# Patient Record
Sex: FEMALE | Race: BLACK OR AFRICAN AMERICAN
[De-identification: names, ages, dates, MRNs, and addresses within clinical notes are randomized per-mention and may not be internally consistent; named-entity substitution may affect disease eponyms.]

---

## 2017-02-22 ENCOUNTER — HOSPITAL ENCOUNTER (OUTPATIENT)
Dept: HOSPITAL 47 - RADMRIMAIN | Age: 47
Discharge: HOME | End: 2017-02-22
Payer: COMMERCIAL

## 2017-02-22 DIAGNOSIS — M25.552: Primary | ICD-10-CM

## 2017-02-22 NOTE — MR
EXAMINATION TYPE: MR hip LT wo con

 

DATE OF EXAM: 2/22/2017 5:36 PM

 

COMPARISON: Outside two-view left hip x-ray February 8, 2017. CT abdomen and pelvis April 18, 2016. 

 

HISTORY: Lt Hip Pain

 

Standard multiplanar, multisequence MRI departmental protocol

 

Multiplanar, multisequence images of the pelvis focus in the left hip were acquired. 

 

FINDINGS: There is metallic artifact degradation at level of right hip joint identified related to fe
moral fixating mee. Hip joints are symmetric with mild axial joint space loss seen bilaterally. There
 are small symmetric physiologic hip joint effusions noted bilaterally. Bone marrow signal intensity 
in the pelvis and visualized left hip show some heterogeneity without suspicious edema. No significan
t fluid signal seen at level of greater or lesser trochanters in the left hip. Muscle bulk in the lef
t thigh is maintained. Labrum appears intact given limitation of nonarthrogram study. No suspicious b
owel or groin containing inguinal hernia is seen. No suspicious groin adenopathy is noted.

 

Urinary bladder is felt within normal limits. Uterus is anteverted in shape and within normal limits 
in size. There is round 2 cm lesion in right aspect of uterine myometrium felt to reflect small fibro
id seen best on axial image 20 incidentally noted. Small moderate free fluid is seen in the pelvis. V
isualized portion of both ovaries show scattered follicles.

 

IMPRESSION: 

No significant finding is seen to account for patient's symptoms.

## 2017-05-02 ENCOUNTER — HOSPITAL ENCOUNTER (OUTPATIENT)
Dept: HOSPITAL 47 - LABWHC1 | Age: 47
Discharge: HOME | End: 2017-05-02
Attending: NURSE PRACTITIONER
Payer: MEDICAID

## 2017-05-02 DIAGNOSIS — E83.39: ICD-10-CM

## 2017-05-02 DIAGNOSIS — N18.3: ICD-10-CM

## 2017-05-02 DIAGNOSIS — D64.9: Primary | ICD-10-CM

## 2017-05-02 DIAGNOSIS — M10.9: ICD-10-CM

## 2017-05-02 DIAGNOSIS — N39.0: ICD-10-CM

## 2017-05-02 DIAGNOSIS — E55.9: ICD-10-CM

## 2017-05-02 LAB
ANION GAP SERPL CALC-SCNC: 9 MMOL/L
BASOPHILS # BLD AUTO: 0 K/UL (ref 0–0.2)
BASOPHILS NFR BLD AUTO: 1 %
BUN SERPL-SCNC: 19 MG/DL (ref 7–17)
CALCIUM SPEC-MCNC: 9.4 MG/DL (ref 8.4–10.2)
CH: 33.3
CHCM: 34.3
CHLORIDE SERPL-SCNC: 107 MMOL/L (ref 98–107)
CO2 SERPL-SCNC: 28 MMOL/L (ref 22–30)
EOSINOPHIL # BLD AUTO: 0.3 K/UL (ref 0–0.7)
EOSINOPHIL NFR BLD AUTO: 7 %
ERYTHROCYTE [DISTWIDTH] IN BLOOD BY AUTOMATED COUNT: 3.48 M/UL (ref 3.8–5.4)
ERYTHROCYTE [DISTWIDTH] IN BLOOD: 13.9 % (ref 11.5–15.5)
GLUCOSE SERPL-MCNC: 82 MG/DL (ref 74–99)
HCT VFR BLD AUTO: 34 % (ref 34–46)
HDW: 2.65
HGB BLD-MCNC: 11.6 GM/DL (ref 11.4–16)
IRON SERPL-MCNC: 75 UG/DL (ref 37–170)
LUC NFR BLD AUTO: 2 %
LYMPHOCYTES # SPEC AUTO: 1.3 K/UL (ref 1–4.8)
LYMPHOCYTES NFR SPEC AUTO: 31 %
MAGNESIUM SPEC-SCNC: 2 MG/DL (ref 1.6–2.3)
MCH RBC QN AUTO: 33.2 PG (ref 25–35)
MCHC RBC AUTO-ENTMCNC: 34.1 G/DL (ref 31–37)
MCV RBC AUTO: 97.6 FL (ref 80–100)
MONOCYTES # BLD AUTO: 0.2 K/UL (ref 0–1)
MONOCYTES NFR BLD AUTO: 4 %
NEUTROPHILS # BLD AUTO: 2.3 K/UL (ref 1.3–7.7)
NEUTROPHILS NFR BLD AUTO: 55 %
NON-AFRICAN AMERICAN GFR(MDRD): 44
PH UR: 7 [PH] (ref 5–8)
PHOSPHATE SERPL-MCNC: 3.8 MG/DL (ref 2.5–4.5)
POTASSIUM SERPL-SCNC: 4.1 MMOL/L (ref 3.5–5.1)
SODIUM SERPL-SCNC: 144 MMOL/L (ref 137–145)
SP GR UR: 1.01 (ref 1–1.03)
TIBC SERPL-MCNC: 252 UG/DL (ref 265–497)
UA BILLING (MACRO VS. MICRO): (no result)
URATE SERPL-MCNC: 5.1 MG/DL (ref 3.7–7.4)
UROBILINOGEN UR QL STRIP: <2 MG/DL (ref ?–2)
WBC # BLD AUTO: 0.09 10*3/UL
WBC # BLD AUTO: 4.2 K/UL (ref 3.8–10.6)
WBC (PEROX): 3.86

## 2017-05-02 PROCEDURE — 85025 COMPLETE CBC W/AUTO DIFF WBC: CPT

## 2017-05-02 PROCEDURE — 80048 BASIC METABOLIC PNL TOTAL CA: CPT

## 2017-05-02 PROCEDURE — 83550 IRON BINDING TEST: CPT

## 2017-05-02 PROCEDURE — 82306 VITAMIN D 25 HYDROXY: CPT

## 2017-05-02 PROCEDURE — 83540 ASSAY OF IRON: CPT

## 2017-05-02 PROCEDURE — 84100 ASSAY OF PHOSPHORUS: CPT

## 2017-05-02 PROCEDURE — 81003 URINALYSIS AUTO W/O SCOPE: CPT

## 2017-05-02 PROCEDURE — 87086 URINE CULTURE/COLONY COUNT: CPT

## 2017-05-02 PROCEDURE — 83970 ASSAY OF PARATHORMONE: CPT

## 2017-05-02 PROCEDURE — 36415 COLL VENOUS BLD VENIPUNCTURE: CPT

## 2017-05-02 PROCEDURE — 83735 ASSAY OF MAGNESIUM: CPT

## 2017-05-02 PROCEDURE — 84550 ASSAY OF BLOOD/URIC ACID: CPT

## 2017-05-02 PROCEDURE — 82728 ASSAY OF FERRITIN: CPT

## 2017-08-30 ENCOUNTER — HOSPITAL ENCOUNTER (OUTPATIENT)
Dept: HOSPITAL 47 - LABWHC1 | Age: 47
Discharge: HOME | End: 2017-08-30
Attending: FAMILY MEDICINE
Payer: MEDICAID

## 2017-08-30 DIAGNOSIS — N18.3: Primary | ICD-10-CM

## 2017-08-30 DIAGNOSIS — R53.83: ICD-10-CM

## 2017-08-30 LAB
ALP SERPL-CCNC: 53 U/L (ref 38–126)
ALT SERPL-CCNC: 26 U/L (ref 9–52)
ANION GAP SERPL CALC-SCNC: 8 MMOL/L
AST SERPL-CCNC: 12 U/L (ref 14–36)
BASOPHILS # BLD AUTO: 0 K/UL (ref 0–0.2)
BASOPHILS NFR BLD AUTO: 1 %
BUN SERPL-SCNC: 20 MG/DL (ref 7–17)
CALCIUM SPEC-MCNC: 9.5 MG/DL (ref 8.4–10.2)
CH: 33.8
CHCM: 34.6
CHLORIDE SERPL-SCNC: 108 MMOL/L (ref 98–107)
CHOLEST SERPL-MCNC: 190 MG/DL (ref ?–200)
CO2 SERPL-SCNC: 27 MMOL/L (ref 22–30)
EOSINOPHIL # BLD AUTO: 0.3 K/UL (ref 0–0.7)
EOSINOPHIL NFR BLD AUTO: 7 %
ERYTHROCYTE [DISTWIDTH] IN BLOOD BY AUTOMATED COUNT: 3.76 M/UL (ref 3.8–5.4)
ERYTHROCYTE [DISTWIDTH] IN BLOOD: 14.3 % (ref 11.5–15.5)
GLUCOSE SERPL-MCNC: 79 MG/DL (ref 74–99)
HCT VFR BLD AUTO: 37 % (ref 34–46)
HDLC SERPL-MCNC: 51 MG/DL (ref 40–60)
HDW: 2.68
HGB BLD-MCNC: 12.4 GM/DL (ref 11.4–16)
LUC NFR BLD AUTO: 2 %
LYMPHOCYTES # SPEC AUTO: 1.7 K/UL (ref 1–4.8)
LYMPHOCYTES NFR SPEC AUTO: 40 %
MCH RBC QN AUTO: 33.1 PG (ref 25–35)
MCHC RBC AUTO-ENTMCNC: 33.6 G/DL (ref 31–37)
MCV RBC AUTO: 98.3 FL (ref 80–100)
MONOCYTES # BLD AUTO: 0.2 K/UL (ref 0–1)
MONOCYTES NFR BLD AUTO: 5 %
NEUTROPHILS # BLD AUTO: 1.9 K/UL (ref 1.3–7.7)
NEUTROPHILS NFR BLD AUTO: 46 %
NON-AFRICAN AMERICAN GFR(MDRD): 41
POTASSIUM SERPL-SCNC: 4.4 MMOL/L (ref 3.5–5.1)
PROT SERPL-MCNC: 7.6 G/DL (ref 6.3–8.2)
SODIUM SERPL-SCNC: 143 MMOL/L (ref 137–145)
WBC # BLD AUTO: 0.08 10*3/UL
WBC # BLD AUTO: 4.2 K/UL (ref 3.8–10.6)
WBC (PEROX): 4.26

## 2017-08-30 PROCEDURE — 85025 COMPLETE CBC W/AUTO DIFF WBC: CPT

## 2017-08-30 PROCEDURE — 80053 COMPREHEN METABOLIC PANEL: CPT

## 2017-08-30 PROCEDURE — 80061 LIPID PANEL: CPT

## 2017-08-30 PROCEDURE — 36415 COLL VENOUS BLD VENIPUNCTURE: CPT

## 2017-09-07 ENCOUNTER — HOSPITAL ENCOUNTER (OUTPATIENT)
Dept: HOSPITAL 47 - RADMAMWWP | Age: 47
Discharge: HOME | End: 2017-09-07
Attending: FAMILY MEDICINE
Payer: MEDICAID

## 2017-09-07 DIAGNOSIS — Z12.31: Primary | ICD-10-CM

## 2017-09-07 PROCEDURE — 77063 BREAST TOMOSYNTHESIS BI: CPT

## 2017-09-13 NOTE — MM
Reason for exam: screening  (asymptomatic).

Last mammogram was performed 2 years and 7 months ago.



Physical Findings:

A clinical breast exam by your physician is recommended on an annual basis and 

results should be correlated with mammographic findings.



MG 3D Screening Mammo W/Cad

Bilateral CC and MLO view(s) were taken.

Prior study comparison: February 18, 2015, mammogram, performed at [facility].

January 22, 2014, mammogram, performed at [facility].

The breast tissue is heterogeneously dense. This may lower the sensitivity of 

mammography.  Focal asymmetry outer right CC view, 4cm from nipple.

This finding is changed when compared with previous exams.





ASSESSMENT: Incomplete: need additional imaging evaluation, BI-RAD 0



RECOMMENDATION:

Special view mammogram of the right breast.



If lesion persists on supplemental views, image directed ultrasound is 

recommended.



[facility] will attempt to contact patient to return for supplemental 

views and ultrasound if indicated.

## 2017-09-14 ENCOUNTER — HOSPITAL ENCOUNTER (OUTPATIENT)
Dept: HOSPITAL 47 - RADMAMWWP | Age: 47
End: 2017-09-14
Attending: FAMILY MEDICINE
Payer: MEDICAID

## 2017-09-14 DIAGNOSIS — R92.8: Primary | ICD-10-CM

## 2017-09-14 NOTE — MM
Reason for exam: additional evaluation requested from abnormal screening.

Last mammogram was performed less than 1 month ago.



Physical Findings:

Nurse did not find any significant physical abnormalities on exam.



MG 3D Work Up W/Cad RT

CC and MLO view(s) were taken of the right breast.

Prior study comparison: September 7, 2017, bilateral MG 3d screening mammo w/cad. 

February 18, 2015, mammogram, performed at [facility].

Asymmetric breast tissue right outer aspect stable from 2014. No persistent 

lesion.



These results were verbally communicated with the patient and result sheet given 

to the patient on 9/14/17.





ASSESSMENT: Benign, BI-RAD 2



RECOMMENDATION:

Return to routine screening mammogram schedule for both breasts.

## 2018-10-08 ENCOUNTER — HOSPITAL ENCOUNTER (OUTPATIENT)
Dept: HOSPITAL 47 - LABWHC1 | Age: 48
End: 2018-10-08
Attending: FAMILY MEDICINE
Payer: MEDICAID

## 2018-10-08 DIAGNOSIS — N18.3: ICD-10-CM

## 2018-10-08 DIAGNOSIS — Z01.419: Primary | ICD-10-CM

## 2018-10-08 DIAGNOSIS — R53.82: ICD-10-CM

## 2018-10-08 LAB
ALBUMIN SERPL-MCNC: 4.1 G/DL (ref 3.5–5)
ALP SERPL-CCNC: 58 U/L (ref 38–126)
ALT SERPL-CCNC: 12 U/L (ref 9–52)
ANION GAP SERPL CALC-SCNC: 9 MMOL/L
AST SERPL-CCNC: 13 U/L (ref 14–36)
BUN SERPL-SCNC: 16 MG/DL (ref 7–17)
CALCIUM SPEC-MCNC: 9.3 MG/DL (ref 8.4–10.2)
CHLORIDE SERPL-SCNC: 105 MMOL/L (ref 98–107)
CHOLEST SERPL-MCNC: 199 MG/DL (ref ?–200)
CO2 SERPL-SCNC: 28 MMOL/L (ref 22–30)
ERYTHROCYTE [DISTWIDTH] IN BLOOD BY AUTOMATED COUNT: 3.81 M/UL (ref 3.8–5.4)
ERYTHROCYTE [DISTWIDTH] IN BLOOD: 13.3 % (ref 11.5–15.5)
GLUCOSE SERPL-MCNC: 70 MG/DL (ref 74–99)
HCT VFR BLD AUTO: 36.4 % (ref 34–46)
HDLC SERPL-MCNC: 55 MG/DL (ref 40–60)
HGB BLD-MCNC: 12.1 GM/DL (ref 11.4–16)
LDLC SERPL CALC-MCNC: 128 MG/DL (ref 0–99)
MCH RBC QN AUTO: 31.9 PG (ref 25–35)
MCHC RBC AUTO-ENTMCNC: 33.4 G/DL (ref 31–37)
MCV RBC AUTO: 95.5 FL (ref 80–100)
PLATELET # BLD AUTO: 117 K/UL (ref 150–450)
POTASSIUM SERPL-SCNC: 4.3 MMOL/L (ref 3.5–5.1)
PROT SERPL-MCNC: 7.7 G/DL (ref 6.3–8.2)
SODIUM SERPL-SCNC: 142 MMOL/L (ref 137–145)
T4 FREE SERPL-MCNC: 0.93 NG/DL (ref 0.78–2.19)
TRIGL SERPL-MCNC: 80 MG/DL (ref ?–150)
WBC # BLD AUTO: 3.9 K/UL (ref 3.8–10.6)

## 2018-10-08 PROCEDURE — 85027 COMPLETE CBC AUTOMATED: CPT

## 2018-10-08 PROCEDURE — 36415 COLL VENOUS BLD VENIPUNCTURE: CPT

## 2018-10-08 PROCEDURE — 80061 LIPID PANEL: CPT

## 2018-10-08 PROCEDURE — 80053 COMPREHEN METABOLIC PANEL: CPT

## 2018-10-08 PROCEDURE — 84439 ASSAY OF FREE THYROXINE: CPT

## 2018-10-08 PROCEDURE — 84443 ASSAY THYROID STIM HORMONE: CPT

## 2018-10-30 ENCOUNTER — HOSPITAL ENCOUNTER (OUTPATIENT)
Dept: HOSPITAL 47 - RADMAMWWP | Age: 48
End: 2018-10-30
Attending: FAMILY MEDICINE
Payer: MEDICAID

## 2018-10-30 DIAGNOSIS — Z12.31: Primary | ICD-10-CM

## 2018-10-30 PROCEDURE — 77067 SCR MAMMO BI INCL CAD: CPT

## 2018-10-30 PROCEDURE — 77063 BREAST TOMOSYNTHESIS BI: CPT

## 2018-10-30 NOTE — MM
Reason for exam: screening  (asymptomatic).

Last mammogram was performed 1 year and 2 months ago.



History:

Took hormonal contraceptives for 2 years.



Physical Findings:

A clinical breast exam by your physician is recommended on an annual basis and 

results should be correlated with mammographic findings.



MG 3D Screening Mammo W/Cad

Bilateral CC and MLO view(s) were taken.

Prior study comparison: September 14, 2017, right breast MG 3d work up w/cad RT.  

September 7, 2017, bilateral MG 3d screening mammo w/cad.

The breast tissue is heterogeneously dense. This may lower the sensitivity of 

mammography.  No suspicious abnormality on right breast. There is lateral middle 

depth distortion on 3D CC 24/54 and a central likely upper focal asymmetry on the 

same CC image at middle depth.





ASSESSMENT: Incomplete: need additional imaging evaluation, BI-RAD 0



RECOMMENDATION:

Special view mammogram of the left breast.



If lesion persists on supplemental views, image directed ultrasound is 

recommended.



[facility] will attempt to contact patient to return for supplemental 

views and ultrasound if indicated.

## 2018-11-09 ENCOUNTER — HOSPITAL ENCOUNTER (OUTPATIENT)
Dept: HOSPITAL 47 - RADMAMWWP | Age: 48
Discharge: HOME | End: 2018-11-09
Attending: FAMILY MEDICINE
Payer: MEDICAID

## 2018-11-09 DIAGNOSIS — R92.8: Primary | ICD-10-CM

## 2018-11-09 DIAGNOSIS — N18.3: ICD-10-CM

## 2018-11-09 DIAGNOSIS — R53.82: ICD-10-CM

## 2018-11-09 LAB
ERYTHROCYTE [DISTWIDTH] IN BLOOD BY AUTOMATED COUNT: 3.85 M/UL (ref 3.8–5.4)
ERYTHROCYTE [DISTWIDTH] IN BLOOD: 13.6 % (ref 11.5–15.5)
HCT VFR BLD AUTO: 37.1 % (ref 34–46)
HGB BLD-MCNC: 12.1 GM/DL (ref 11.4–16)
MCH RBC QN AUTO: 31.5 PG (ref 25–35)
MCHC RBC AUTO-ENTMCNC: 32.6 G/DL (ref 31–37)
MCV RBC AUTO: 96.5 FL (ref 80–100)
PLATELET # BLD AUTO: 130 K/UL (ref 150–450)
VIT B12 SERPL-MCNC: 1088 PG/ML (ref 200–944)
WBC # BLD AUTO: 4 K/UL (ref 3.8–10.6)

## 2018-11-09 PROCEDURE — 36415 COLL VENOUS BLD VENIPUNCTURE: CPT

## 2018-11-09 PROCEDURE — 85027 COMPLETE CBC AUTOMATED: CPT

## 2018-11-09 PROCEDURE — 77065 DX MAMMO INCL CAD UNI: CPT

## 2018-11-09 PROCEDURE — 77061 BREAST TOMOSYNTHESIS UNI: CPT

## 2018-11-09 PROCEDURE — 86038 ANTINUCLEAR ANTIBODIES: CPT

## 2018-11-09 PROCEDURE — 82607 VITAMIN B-12: CPT

## 2018-11-09 PROCEDURE — 82746 ASSAY OF FOLIC ACID SERUM: CPT

## 2018-11-09 NOTE — MM
Reason for exam: additional evaluation requested from abnormal screening.

Last mammogram was performed less than 1 month ago.



History:

Took hormonal contraceptives for 2 years.



Physical Findings:

Nurse did not find any significant physical abnormalities on exam.



MG 3D Work Up W/Cad LT

Spot compression CC, spot compression MLO, and ML view(s) were taken of the left 

breast.

Prior study comparison: October 30, 2018, bilateral MG 3d screening mammo w/cad.  

September 14, 2017, right breast MG 3d work up w/cad RT.

The breast tissue is heterogeneously dense. This may lower the sensitivity of 

mammography.  There is no discrete abnormality, 2 locations left upper outer 

quadrant.



These results were verbally communicated with the patient and result sheet given 

to the patient on 11/9/18.





ASSESSMENT: Probably benign, BI-RAD 3



RECOMMENDATION:

Follow-up diagnostic mammogram of the left breast in 6 months.

## 2019-01-10 ENCOUNTER — HOSPITAL ENCOUNTER (OUTPATIENT)
Dept: HOSPITAL 47 - LABWHC1 | Age: 49
Discharge: HOME | End: 2019-01-10
Attending: INTERNAL MEDICINE
Payer: MEDICAID

## 2019-01-10 DIAGNOSIS — N25.81: ICD-10-CM

## 2019-01-10 DIAGNOSIS — N18.2: Primary | ICD-10-CM

## 2019-01-10 DIAGNOSIS — D63.1: ICD-10-CM

## 2019-01-10 DIAGNOSIS — N39.0: ICD-10-CM

## 2019-01-10 DIAGNOSIS — E55.9: ICD-10-CM

## 2019-01-10 LAB
ANION GAP SERPL CALC-SCNC: 6 MMOL/L (ref 4–12)
BUN SERPL-SCNC: 16 MG/DL (ref 9–27)
CALCIUM SPEC-MCNC: 8.9 MG/DL (ref 8.7–10.3)
CHLORIDE SERPL-SCNC: 106 MMOL/L (ref 96–109)
CO2 SERPL-SCNC: 28 MMOL/L (ref 21.6–31.8)
ERYTHROCYTE [DISTWIDTH] IN BLOOD BY AUTOMATED COUNT: 3.75 M/UL (ref 3.8–5.4)
ERYTHROCYTE [DISTWIDTH] IN BLOOD: 13.6 % (ref 11.5–15.5)
GLUCOSE SERPL-MCNC: 81 MG/DL (ref 70–110)
HCT VFR BLD AUTO: 35.9 % (ref 34–46)
HGB BLD-MCNC: 12.3 GM/DL (ref 11.4–16)
MCH RBC QN AUTO: 32.8 PG (ref 25–35)
MCHC RBC AUTO-ENTMCNC: 34.2 G/DL (ref 31–37)
MCV RBC AUTO: 95.9 FL (ref 80–100)
PH UR: 7.5 [PH] (ref 5–8)
PLATELET # BLD AUTO: 139 K/UL (ref 150–450)
POTASSIUM SERPL-SCNC: 4.7 MMOL/L (ref 3.5–5.5)
PTH-INTACT SERPL-MCNC: 61.1 PG/ML (ref 14–72)
RBC UR QL: <1 /HPF (ref 0–5)
SODIUM SERPL-SCNC: 140 MMOL/L (ref 135–145)
SP GR UR: 1.01 (ref 1–1.03)
SQUAMOUS UR QL AUTO: 11 /HPF (ref 0–4)
UROBILINOGEN UR QL STRIP: <2 MG/DL (ref ?–2)
WBC # BLD AUTO: 4.3 K/UL (ref 3.8–10.6)
WBC #/AREA URNS HPF: 3 /HPF (ref 0–5)

## 2019-01-10 PROCEDURE — 83540 ASSAY OF IRON: CPT

## 2019-01-10 PROCEDURE — 80048 BASIC METABOLIC PNL TOTAL CA: CPT

## 2019-01-10 PROCEDURE — 81001 URINALYSIS AUTO W/SCOPE: CPT

## 2019-01-10 PROCEDURE — 82306 VITAMIN D 25 HYDROXY: CPT

## 2019-01-10 PROCEDURE — 36415 COLL VENOUS BLD VENIPUNCTURE: CPT

## 2019-01-10 PROCEDURE — 83970 ASSAY OF PARATHORMONE: CPT

## 2019-01-10 PROCEDURE — 85027 COMPLETE CBC AUTOMATED: CPT

## 2019-01-10 PROCEDURE — 83550 IRON BINDING TEST: CPT

## 2019-01-10 PROCEDURE — 82728 ASSAY OF FERRITIN: CPT

## 2019-01-30 ENCOUNTER — HOSPITAL ENCOUNTER (OUTPATIENT)
Dept: HOSPITAL 47 - RADUSWWP | Age: 49
Discharge: HOME | End: 2019-01-30
Attending: NURSE PRACTITIONER
Payer: MEDICAID

## 2019-01-30 DIAGNOSIS — N26.1: Primary | ICD-10-CM

## 2019-01-30 PROCEDURE — 76770 US EXAM ABDO BACK WALL COMP: CPT

## 2019-01-30 NOTE — US
EXAMINATION TYPE: US kidneys/renal and bladder

 

DATE OF EXAM: 1/30/2019

 

COMPARISON: NONE

 

CLINICAL HISTORY: 48-year-old female R10.9 Left Flank Pain. Pain reports right renal failure

 

TECHNIQUE: Multiple sonographic images of the kidneys and bladder are obtained.

 

FINDINGS:

 

EXAM MEASUREMENTS:

 

Right Kidney:  6.3 x 3.2 x 3.6 cm

Left Kidney: 9.9 x 6.8 x 5.3 cm

 

 

 

Right Kidney: atrophied consistent with chronic kidney disease; no hydronephrosis  

Left Kidney: No hydronephrosis.

 

 

Bladder: Under distention limits its evaluation

**Bilateral Jets seen: no

 

 

 

 

 

 

 

 

IMPRESSION:

Atrophic right kidney. No hydronephrosis seen on either side.

## 2019-07-09 ENCOUNTER — HOSPITAL ENCOUNTER (OUTPATIENT)
Dept: HOSPITAL 47 - RADXRMAIN | Age: 49
Discharge: HOME | End: 2019-07-09
Payer: COMMERCIAL

## 2019-07-09 DIAGNOSIS — M12.879: ICD-10-CM

## 2019-07-09 DIAGNOSIS — M25.562: Primary | ICD-10-CM

## 2019-07-09 DIAGNOSIS — Z87.81: ICD-10-CM

## 2019-07-09 NOTE — XR
EXAMINATION TYPE: XR foot complete LT

 

DATE OF EXAM: 7/9/2019

 

COMPARISON: NONE

 

HISTORY: Pain

 

TECHNIQUE: Three views are submitted.

 

FINDINGS:

The osseous structures are intact.    There is no acute fracture or dislocation. There is mild hypert
rophic change of the first MTP chronic appearing deformity of the medial malleolus noted. There is a 
deformity along the dome of the talus. Arthropathy of the ankle joint.

 

IMPRESSION:

1. 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  There als
o appears to be an age-indeterminate deformity of the talus which can be associated either trauma or 
osteonecrosis. Recommend follow-up CT scan or MRI.

## 2019-07-09 NOTE — XR
EXAMINATION TYPE: XR ankle complete LT

 

DATE OF EXAM: 7/9/2019

 

COMPARISON: NONE

 

HISTORY: Pain

 

FINDINGS:

Three views of the ankle demonstrate the ankle mortise to be narrowed in a symmetric. There is marked
 hypertrophic changes involving the medial malleolus. Could not exclude abnormality to the talus. Rem
aining osseous structures intact.

 

IMPRESSION:

1. Marked arthropathy. Remote trauma suspected. There is a deformity of the talus for which CT scan i
s recommended to exclude acute fracture.

 

A Yellow level critical message alert has been initiated for Yefri Thurman DO via the [employer] Critical Results System on 7/9/2019 11:33 AM.  This message alert has been sent to Yefri oreilly DO via the preferences provided by the clinician for the receipt of Radiology Critical Finding
s. Message ID [id number].

## 2019-07-09 NOTE — XR
EXAMINATION TYPE: XR knee complete LT

 

DATE OF EXAM: 7/9/2019

 

COMPARISON: NONE

 

HISTORY: 48-year-old female pain after fall

 

TECHNIQUE: Reviews

 

FINDINGS: 

Joint spaces are maintained. Extensor mechanism intact. No significant joint effusion. No acute fract
ure, subluxation, or dislocation.

 

 

IMPRESSION: 

No acute osseous abnormality seen.

## 2019-07-17 ENCOUNTER — HOSPITAL ENCOUNTER (OUTPATIENT)
Dept: HOSPITAL 47 - LABWHC1 | Age: 49
Discharge: HOME | End: 2019-07-17
Attending: NURSE PRACTITIONER
Payer: MEDICAID

## 2019-07-17 DIAGNOSIS — R10.9: ICD-10-CM

## 2019-07-17 DIAGNOSIS — M10.9: ICD-10-CM

## 2019-07-17 DIAGNOSIS — N18.3: Primary | ICD-10-CM

## 2019-07-17 LAB
ANION GAP SERPL CALC-SCNC: 7.9 MMOL/L (ref 4–12)
BUN SERPL-SCNC: 17 MG/DL (ref 9–27)
BUN/CREAT SERPL: 12.14 RATIO (ref 12–20)
CALCIUM SPEC-MCNC: 9.1 MG/DL (ref 8.7–10.3)
CHLORIDE SERPL-SCNC: 105 MMOL/L (ref 96–109)
CO2 SERPL-SCNC: 28.1 MMOL/L (ref 21.6–31.8)
GLUCOSE SERPL-MCNC: 84 MG/DL (ref 70–110)
POTASSIUM SERPL-SCNC: 4.4 MMOL/L (ref 3.5–5.5)
SODIUM SERPL-SCNC: 141 MMOL/L (ref 135–145)
URATE SERPL-MCNC: 6.3 MG/DL (ref 2.9–7.7)

## 2019-07-17 PROCEDURE — 80048 BASIC METABOLIC PNL TOTAL CA: CPT

## 2019-07-17 PROCEDURE — 36415 COLL VENOUS BLD VENIPUNCTURE: CPT

## 2019-07-17 PROCEDURE — 84550 ASSAY OF BLOOD/URIC ACID: CPT

## 2019-07-19 ENCOUNTER — HOSPITAL ENCOUNTER (OUTPATIENT)
Dept: HOSPITAL 47 - LABWHC1 | Age: 49
Discharge: HOME | End: 2019-07-19
Attending: NURSE PRACTITIONER
Payer: MEDICAID

## 2019-07-19 DIAGNOSIS — N18.3: Primary | ICD-10-CM

## 2019-07-19 LAB
BASOPHILS # BLD AUTO: 0 K/UL (ref 0–0.2)
BASOPHILS NFR BLD AUTO: 1 %
EOSINOPHIL # BLD AUTO: 0.6 K/UL (ref 0–0.7)
EOSINOPHIL NFR BLD AUTO: 13 %
ERYTHROCYTE [DISTWIDTH] IN BLOOD BY AUTOMATED COUNT: 3.96 M/UL (ref 3.8–5.4)
ERYTHROCYTE [DISTWIDTH] IN BLOOD: 13.4 % (ref 11.5–15.5)
HCT VFR BLD AUTO: 37.4 % (ref 34–46)
HGB BLD-MCNC: 12.1 GM/DL (ref 11.4–16)
LYMPHOCYTES # SPEC AUTO: 1.5 K/UL (ref 1–4.8)
LYMPHOCYTES NFR SPEC AUTO: 33 %
MCH RBC QN AUTO: 30.5 PG (ref 25–35)
MCHC RBC AUTO-ENTMCNC: 32.3 G/DL (ref 31–37)
MCV RBC AUTO: 94.6 FL (ref 80–100)
MONOCYTES # BLD AUTO: 0.2 K/UL (ref 0–1)
MONOCYTES NFR BLD AUTO: 5 %
NEUTROPHILS # BLD AUTO: 2.1 K/UL (ref 1.3–7.7)
NEUTROPHILS NFR BLD AUTO: 47 %
PH UR: 8 [PH] (ref 5–8)
PLATELET # BLD AUTO: 145 K/UL (ref 150–450)
SP GR UR: 1.01 (ref 1–1.03)
UROBILINOGEN UR QL STRIP: <2 MG/DL (ref ?–2)
WBC # BLD AUTO: 4.4 K/UL (ref 3.8–10.6)

## 2019-07-19 PROCEDURE — 84443 ASSAY THYROID STIM HORMONE: CPT

## 2019-07-19 PROCEDURE — 82043 UR ALBUMIN QUANTITATIVE: CPT

## 2019-07-19 PROCEDURE — 85025 COMPLETE CBC W/AUTO DIFF WBC: CPT

## 2019-07-19 PROCEDURE — 82570 ASSAY OF URINE CREATININE: CPT

## 2019-07-19 PROCEDURE — 81003 URINALYSIS AUTO W/O SCOPE: CPT

## 2019-07-19 PROCEDURE — 36415 COLL VENOUS BLD VENIPUNCTURE: CPT

## 2019-07-19 PROCEDURE — 82728 ASSAY OF FERRITIN: CPT

## 2019-07-19 PROCEDURE — 83540 ASSAY OF IRON: CPT

## 2019-07-19 PROCEDURE — 87086 URINE CULTURE/COLONY COUNT: CPT

## 2019-07-19 PROCEDURE — 83550 IRON BINDING TEST: CPT

## 2019-08-14 ENCOUNTER — HOSPITAL ENCOUNTER (OUTPATIENT)
Dept: HOSPITAL 47 - RADCTMAIN | Age: 49
Discharge: HOME | End: 2019-08-14
Attending: FAMILY MEDICINE
Payer: MEDICAID

## 2019-08-14 DIAGNOSIS — Z88.1: ICD-10-CM

## 2019-08-14 DIAGNOSIS — Z91.09: ICD-10-CM

## 2019-08-14 DIAGNOSIS — Z91.012: ICD-10-CM

## 2019-08-14 DIAGNOSIS — N26.1: Primary | ICD-10-CM

## 2019-08-14 PROCEDURE — 74150 CT ABDOMEN W/O CONTRAST: CPT

## 2019-08-14 NOTE — CT
EXAMINATION TYPE: CT abdomen wo con

 

DATE OF EXAM: 8/14/2019

 

COMPARISON: 8/18/2016

 

HISTORY: mid abd pain, diarrhea

 

CT DLP: 279.7 mGycm

Automated exposure control for dose reduction was used.

 

TECHNIQUE:  Helical acquisition of images was performed from the lung bases through the top of iliac 
crest to include entire abdomen.

 

CONTRAST: 

Performed without Oral Contrast and without IV contrast.

 

FINDINGS: 

Evaluation is limited due to the lack of oral and intravenous contrast.

 

LUNG BASES: No significant abnormality is appreciated.

 

LIVER/GB: No significant abnormality is appreciated.

 

PANCREAS: No significant abnormality is seen.

 

SPLEEN: No significant abnormality is seen.

 

ADRENALS: No significant abnormality is seen.

 

KIDNEYS: Interval development of severe atrophy of the right kidney with a few cortical calcification
s. Previously seen severe hydronephrosis has resolved. There are a few calcifications seen along the 
course of the right ureter of uncertain etiology, but appear present when compared to 8/18/2016. No e
vidence of left-sided hydronephrosis. No left-sided nephrolithiasis.

 

BOWEL:  No significant abnormality is seen.

 

LYMPH NODES:  No significant abnormality is appreciated.

 

OSSEOUS STRUCTURES:  No significant abnormality is seen.

 

FREE AIR:  No free air is visualized.

 

OTHER: No significant additional abnormality is visualized.

 

IMPRESSION: 

DEVELOPMENT OF SEVERE RIGHT KIDNEY ATROPHY, NEW WHEN COMPARED TO 8/18/2016.

 

LIMITED EVALUATION OF THE SOLID ORGANS DUE TO THE LACK OF INTRAVENOUS CONTRAST. GIVEN THESE LIMITATIO
NS, THERE DOES NOT APPEAR TO BE AN ACUTE PROCESS WITHIN THE ABDOMEN.